# Patient Record
Sex: FEMALE | Race: BLACK OR AFRICAN AMERICAN | NOT HISPANIC OR LATINO | URBAN - METROPOLITAN AREA
[De-identification: names, ages, dates, MRNs, and addresses within clinical notes are randomized per-mention and may not be internally consistent; named-entity substitution may affect disease eponyms.]

---

## 2022-04-30 ENCOUNTER — TELEPHONE ENCOUNTER (OUTPATIENT)
Dept: URBAN - METROPOLITAN AREA CLINIC 121 | Facility: CLINIC | Age: 72
End: 2022-04-30

## 2022-05-01 ENCOUNTER — TELEPHONE ENCOUNTER (OUTPATIENT)
Dept: URBAN - METROPOLITAN AREA CLINIC 121 | Facility: CLINIC | Age: 72
End: 2022-05-01

## 2022-05-01 RX ORDER — ASPIRIN 325 MG/1
TABLET ORAL
Status: ACTIVE | COMMUNITY
Start: 2019-01-03

## 2022-05-01 RX ORDER — VITAMIN E MIXED 1000 UNIT
CAPSULE ORAL
Status: ACTIVE | COMMUNITY
Start: 2008-10-31

## 2023-05-01 ENCOUNTER — OFFICE VISIT (OUTPATIENT)
Dept: URBAN - METROPOLITAN AREA CLINIC 27 | Facility: CLINIC | Age: 73
End: 2023-05-01
Payer: MEDICARE

## 2023-05-01 VITALS
HEART RATE: 92 BPM | WEIGHT: 170 LBS | HEIGHT: 61 IN | BODY MASS INDEX: 32.1 KG/M2 | DIASTOLIC BLOOD PRESSURE: 106 MMHG | SYSTOLIC BLOOD PRESSURE: 197 MMHG

## 2023-05-01 DIAGNOSIS — Z86.010 HISTORY OF COLON POLYPS: ICD-10-CM

## 2023-05-01 PROCEDURE — 99203 OFFICE O/P NEW LOW 30 MIN: CPT | Performed by: INTERNAL MEDICINE

## 2023-05-01 RX ORDER — ASPIRIN 325 MG/1
TABLET ORAL
Status: ACTIVE | COMMUNITY
Start: 2019-01-03

## 2023-05-01 RX ORDER — VITAMIN E MIXED 1000 UNIT
CAPSULE ORAL
Status: ACTIVE | COMMUNITY
Start: 2008-10-31

## 2023-05-01 NOTE — HPI-TODAY'S VISIT:
Mrs. Tran presents today inquiring about a second opinion since her recent colonoscopy on August 5, 2023 with Dr. Carol Hollis in [place].  She was noted to have a ulcerated flat polyp measuring 1.5 cm in the cecum which revealed adenocarcinoma, 3 polyps measuring 3 to 4 mm, 5 mm polyp in the ascending colon and a 5 mm polyp in the transverse colon which all were adenomatous.  She presents to inquire whether she needs to have another colonoscopy exam.  She was given a referral to a general surgeon and is supposed to have a CT scan of the abdomen.  Otherwise, she has no other GI complaints.

## 2023-05-02 ENCOUNTER — LAB OUTSIDE AN ENCOUNTER (OUTPATIENT)
Dept: URBAN - METROPOLITAN AREA CLINIC 27 | Facility: CLINIC | Age: 73
End: 2023-05-02

## 2023-05-02 PROBLEM — 428283002: Status: ACTIVE | Noted: 2023-05-02

## 2023-05-15 ENCOUNTER — OFFICE VISIT (OUTPATIENT)
Dept: URBAN - METROPOLITAN AREA CLINIC 27 | Facility: CLINIC | Age: 73
End: 2023-05-15
Payer: MEDICARE

## 2023-05-15 ENCOUNTER — DASHBOARD ENCOUNTERS (OUTPATIENT)
Age: 73
End: 2023-05-15

## 2023-05-15 VITALS
SYSTOLIC BLOOD PRESSURE: 168 MMHG | HEART RATE: 83 BPM | WEIGHT: 167 LBS | DIASTOLIC BLOOD PRESSURE: 81 MMHG | BODY MASS INDEX: 31.53 KG/M2 | HEIGHT: 61 IN

## 2023-05-15 DIAGNOSIS — C18.0 ADENOCARCINOMA OF CECUM: ICD-10-CM

## 2023-05-15 PROBLEM — 413446001: Status: ACTIVE | Noted: 2023-05-15

## 2023-05-15 PROCEDURE — 99214 OFFICE O/P EST MOD 30 MIN: CPT | Performed by: INTERNAL MEDICINE

## 2023-05-15 RX ORDER — ASPIRIN 325 MG/1
TABLET ORAL
Status: ACTIVE | COMMUNITY
Start: 2019-01-03

## 2023-05-15 RX ORDER — VITAMIN E MIXED 1000 UNIT
CAPSULE ORAL
Status: ACTIVE | COMMUNITY
Start: 2008-10-31

## 2023-05-22 ENCOUNTER — TELEPHONE ENCOUNTER (OUTPATIENT)
Dept: URBAN - METROPOLITAN AREA CLINIC 6 | Facility: CLINIC | Age: 73
End: 2023-05-22